# Patient Record
Sex: FEMALE | URBAN - METROPOLITAN AREA
[De-identification: names, ages, dates, MRNs, and addresses within clinical notes are randomized per-mention and may not be internally consistent; named-entity substitution may affect disease eponyms.]

---

## 2018-05-14 ENCOUNTER — EMERGENCY (EMERGENCY)
Facility: HOSPITAL | Age: 63
LOS: 1 days | Discharge: HOME | End: 2018-05-14
Attending: EMERGENCY MEDICINE
Payer: COMMERCIAL

## 2018-05-14 VITALS
TEMPERATURE: 98 F | WEIGHT: 164.02 LBS | RESPIRATION RATE: 16 BRPM | HEART RATE: 75 BPM | OXYGEN SATURATION: 98 % | HEIGHT: 65 IN | DIASTOLIC BLOOD PRESSURE: 66 MMHG | SYSTOLIC BLOOD PRESSURE: 137 MMHG

## 2018-05-14 VITALS
RESPIRATION RATE: 18 BRPM | OXYGEN SATURATION: 99 % | TEMPERATURE: 99 F | DIASTOLIC BLOOD PRESSURE: 74 MMHG | SYSTOLIC BLOOD PRESSURE: 129 MMHG | HEART RATE: 79 BPM

## 2018-05-14 PROCEDURE — 73620 X-RAY EXAM OF FOOT: CPT

## 2018-05-14 PROCEDURE — 73562 X-RAY EXAM OF KNEE 3: CPT | Mod: 26,RT

## 2018-05-14 PROCEDURE — 73562 X-RAY EXAM OF KNEE 3: CPT

## 2018-05-14 PROCEDURE — 99284 EMERGENCY DEPT VISIT MOD MDM: CPT | Mod: 25

## 2018-05-14 PROCEDURE — 99284 EMERGENCY DEPT VISIT MOD MDM: CPT

## 2018-05-14 PROCEDURE — 73620 X-RAY EXAM OF FOOT: CPT | Mod: 26,LT

## 2018-05-14 RX ORDER — OXYCODONE AND ACETAMINOPHEN 5; 325 MG/1; MG/1
1 TABLET ORAL ONCE
Qty: 0 | Refills: 0 | Status: DISCONTINUED | OUTPATIENT
Start: 2018-05-14 | End: 2018-05-14

## 2018-05-14 RX ORDER — ONDANSETRON 8 MG/1
4 TABLET, FILM COATED ORAL ONCE
Qty: 0 | Refills: 0 | Status: COMPLETED | OUTPATIENT
Start: 2018-05-14 | End: 2018-05-14

## 2018-05-14 RX ORDER — IBUPROFEN 200 MG
1 TABLET ORAL
Qty: 21 | Refills: 0 | OUTPATIENT
Start: 2018-05-14 | End: 2018-05-20

## 2018-05-14 RX ORDER — TETANUS TOXOID, REDUCED DIPHTHERIA TOXOID AND ACELLULAR PERTUSSIS VACCINE, ADSORBED 5; 2.5; 8; 8; 2.5 [IU]/.5ML; [IU]/.5ML; UG/.5ML; UG/.5ML; UG/.5ML
0.5 SUSPENSION INTRAMUSCULAR ONCE
Qty: 0 | Refills: 0 | Status: COMPLETED | OUTPATIENT
Start: 2018-05-14 | End: 2018-05-14

## 2018-05-14 RX ADMIN — OXYCODONE AND ACETAMINOPHEN 1 TABLET(S): 5; 325 TABLET ORAL at 17:40

## 2018-05-14 RX ADMIN — ONDANSETRON 4 MILLIGRAM(S): 8 TABLET, FILM COATED ORAL at 16:43

## 2018-05-14 RX ADMIN — OXYCODONE AND ACETAMINOPHEN 1 TABLET(S): 5; 325 TABLET ORAL at 16:43

## 2018-05-14 NOTE — ED PROVIDER NOTE - MUSCULOSKELETAL, MLM
Spine appears normal, range of motion is limited, Rt knee- tenderness to palp, unable to flex knee, no effusion, no deformity, Lt palm- few abrasion, FROM, Lt foot- sl tenderness to palp dorsal/lat aspect, no swelling, deformity, Desai-neg

## 2018-05-14 NOTE — ED PROVIDER NOTE - OBJECTIVE STATEMENT
63 y.o. female BIBA pt tripped & fell forward, sustained Rt knee pain, Lt foot pain, denies head injury, pt got up with assistance, able to ambulate in the beginning, pt also sustained abrasion to Lt palm, last tetanus?  Pt took Ibuprofen with no relief

## 2018-05-14 NOTE — ED ADULT NURSE NOTE - OBJECTIVE STATEMENT
Pt AOx3, ambulatory, c/o trip and fall this morning, Pt denies dizziness, no n/v, No LOC, no head trauma. Pt endorses right knee pain, left hand pain. No abrasion, no bleeding, no deformity noted

## 2018-05-14 NOTE — ED PROVIDER NOTE - NS HIV RISK FACTOR YES
3rd attempt:  Letter sent.    Left message for patient to return call to clinic and ask to speak with RN.    Kezia Douglass RN,   [facility]     Declined

## 2018-05-14 NOTE — ED PROVIDER NOTE - PROGRESS NOTE DETAILS
Baljit dressing applied, aircast placed, crutches given, pt's able to ambulate with crutches, pt claims she will see her orthopedics, will d/c home with sister

## 2018-05-14 NOTE — ED PROVIDER NOTE - CARE PLAN
Principal Discharge DX:	Patella fracture Principal Discharge DX:	Patella fracture  Secondary Diagnosis:	Foot pain  Secondary Diagnosis:	Abrasion

## 2021-08-25 NOTE — ED ADULT NURSE NOTE - RESPIRATORY ASSESSMENT
Refilled per [facility], [facility] protocol.   Requested Prescriptions   Pending Prescriptions Disp Refills    METOPROLOL SUCCINATE 50 MG Oral Tablet 24 Hr [Pharmacy Med Name: Metoprolol Succinate Er 24hr 50 Mg Tab Nort] 30 tablet 0     Sig: Take 1 tablet (50 mg WDL

## 2022-09-21 NOTE — ED PROVIDER NOTE - CPE EDP EYES NORM
normal...
I, Angel Bruno, performed a history and physical exam of the patient and discussed their management with the resident and /or advanced care provider. I reviewed the resident and /or ACP's note and agree with the documented findings and plan of care. I was present and available for all procedures.  ---  87 year old female presenting for constipation and rectal pain. Normal BMs are daily but has not been able to have BM x 2 days and has a lot of straining and rectal pain when doing so. No nausea, vomiting, chest pain, SOB. No blood in stool. Able to feel hard stool in rectal vault. Plan: labs, CT, disimpaction